# Patient Record
Sex: FEMALE | Race: WHITE | NOT HISPANIC OR LATINO | Employment: UNEMPLOYED | URBAN - METROPOLITAN AREA
[De-identification: names, ages, dates, MRNs, and addresses within clinical notes are randomized per-mention and may not be internally consistent; named-entity substitution may affect disease eponyms.]

---

## 2018-07-25 ENCOUNTER — HISTORICAL (OUTPATIENT)
Dept: ADMINISTRATIVE | Facility: HOSPITAL | Age: 53
End: 2018-07-25

## 2018-07-25 LAB
ABS NEUT (OLG): 1.9
ALBUMIN SERPL-MCNC: 4.2 GM/DL (ref 3.4–5)
ALBUMIN/GLOB SERPL: 1.83 {RATIO} (ref 1.5–2.5)
ALP SERPL-CCNC: 57 UNIT/L (ref 38–126)
ALT SERPL-CCNC: 15 UNIT/L (ref 7–52)
APPEARANCE, UA: CLEAR
AST SERPL-CCNC: 18 UNIT/L (ref 15–37)
BACTERIA #/AREA URNS AUTO: ABNORMAL /HPF
BILIRUB SERPL-MCNC: 0.7 MG/DL (ref 0.2–1)
BILIRUB UR QL STRIP: NEGATIVE MG/DL
BILIRUBIN DIRECT+TOT PNL SERPL-MCNC: 0.2 MG/DL (ref 0–0.5)
BILIRUBIN DIRECT+TOT PNL SERPL-MCNC: 0.5 MG/DL
BUN SERPL-MCNC: 22 MG/DL (ref 7–18)
CALCIUM SERPL-MCNC: 8.6 MG/DL (ref 8.5–10)
CHLORIDE SERPL-SCNC: 100 MMOL/L (ref 98–107)
CHOLEST SERPL-MCNC: 186 MG/DL (ref 0–200)
CHOLEST/HDLC SERPL: 2.7 {RATIO}
CO2 SERPL-SCNC: 32 MMOL/L (ref 21–32)
COLOR UR: YELLOW
CREAT SERPL-MCNC: 0.72 MG/DL (ref 0.6–1.3)
ERYTHROCYTE [DISTWIDTH] IN BLOOD BY AUTOMATED COUNT: 12.5 % (ref 11.5–17)
GLOBULIN SER-MCNC: 2.3 GM/DL (ref 1.2–3)
GLUCOSE (UA): NEGATIVE MG/DL
GLUCOSE SERPL-MCNC: 100 MG/DL (ref 74–106)
HCT VFR BLD AUTO: 41.4 % (ref 37–47)
HDLC SERPL-MCNC: 69 MG/DL (ref 35–60)
HGB BLD-MCNC: 14 GM/DL (ref 12–16)
HGB UR QL STRIP: ABNORMAL UNIT/L
KETONES UR QL STRIP: NEGATIVE MG/DL
LDLC SERPL CALC-MCNC: 98 MG/DL (ref 0–129)
LEUKOCYTE ESTERASE UR QL STRIP: NEGATIVE UNIT/L
LYMPHOCYTES # BLD AUTO: 1.1 X10(3)/MCL (ref 0.6–3.4)
LYMPHOCYTES NFR BLD AUTO: 31.8 % (ref 13–40)
MCH RBC QN AUTO: 32.2 PG (ref 27–31.2)
MCHC RBC AUTO-ENTMCNC: 34 GM/DL (ref 32–36)
MCV RBC AUTO: 95 FL (ref 80–94)
MONOCYTES # BLD AUTO: 0.5 X10(3)/MCL (ref 0–1.8)
MONOCYTES NFR BLD AUTO: 14.1 % (ref 0.1–24)
NEUTROPHILS NFR BLD AUTO: 54.1 % (ref 47–80)
NITRITE UR QL STRIP.AUTO: NEGATIVE
PH UR STRIP: 7 [PH]
PLATELET # BLD AUTO: 168 X10(3)/MCL (ref 130–400)
PMV BLD AUTO: 10.5 FL
POTASSIUM SERPL-SCNC: 3.6 MMOL/L (ref 3.5–5.1)
PROT SERPL-MCNC: 6.5 GM/DL (ref 6.4–8.2)
PROT UR QL STRIP: NEGATIVE MG/DL
RBC # BLD AUTO: 4.35 X10(6)/MCL (ref 4.2–5.4)
RBC #/AREA URNS HPF: ABNORMAL /HPF
SODIUM SERPL-SCNC: 140 MMOL/L (ref 136–145)
SP GR UR STRIP: 1.01
SQUAMOUS EPITHELIAL, UA: ABNORMAL /LPF
TRIGL SERPL-MCNC: 58 MG/DL (ref 30–150)
TSH SERPL-ACNC: 1 MIU/ML (ref 0.35–4.94)
UROBILINOGEN UR STRIP-ACNC: 0.2 MG/DL
VLDLC SERPL CALC-MCNC: 11.6 MG/DL
WBC # SPEC AUTO: 3.5 X10(3)/MCL (ref 4.5–11.5)
WBC #/AREA URNS AUTO: ABNORMAL /[HPF]

## 2018-09-06 ENCOUNTER — HISTORICAL (OUTPATIENT)
Dept: ADMINISTRATIVE | Facility: HOSPITAL | Age: 53
End: 2018-09-06

## 2018-09-06 LAB
ABS NEUT (OLG): 2.4
ERYTHROCYTE [DISTWIDTH] IN BLOOD BY AUTOMATED COUNT: 12.7 % (ref 11.5–17)
HCT VFR BLD AUTO: 42.1 % (ref 37–47)
HGB BLD-MCNC: 14 GM/DL (ref 12–16)
LYMPHOCYTES # BLD AUTO: 1.5 X10(3)/MCL (ref 0.6–3.4)
LYMPHOCYTES NFR BLD AUTO: 34.5 % (ref 13–40)
MCH RBC QN AUTO: 31.9 PG (ref 27–31.2)
MCHC RBC AUTO-ENTMCNC: 33 GM/DL (ref 32–36)
MCV RBC AUTO: 96 FL (ref 80–94)
MONOCYTES # BLD AUTO: 0.5 X10(3)/MCL (ref 0–1.8)
MONOCYTES NFR BLD AUTO: 10.7 % (ref 0.1–24)
NEUTROPHILS NFR BLD AUTO: 54.8 % (ref 47–80)
PLATELET # BLD AUTO: 169 X10(3)/MCL (ref 130–400)
PMV BLD AUTO: 10.1 FL
RBC # BLD AUTO: 4.39 X10(6)/MCL (ref 4.2–5.4)
WBC # SPEC AUTO: 4.4 X10(3)/MCL (ref 4.5–11.5)

## 2019-01-08 ENCOUNTER — OFFICE VISIT (OUTPATIENT)
Dept: INFECTIOUS DISEASES | Facility: CLINIC | Age: 54
End: 2019-01-08

## 2019-01-08 ENCOUNTER — CLINICAL SUPPORT (OUTPATIENT)
Dept: INFECTIOUS DISEASES | Facility: CLINIC | Age: 54
End: 2019-01-08

## 2019-01-08 VITALS
TEMPERATURE: 98 F | WEIGHT: 120.56 LBS | SYSTOLIC BLOOD PRESSURE: 131 MMHG | HEART RATE: 55 BPM | DIASTOLIC BLOOD PRESSURE: 74 MMHG

## 2019-01-08 DIAGNOSIS — Z71.84 COUNSELING ABOUT TRAVEL: Primary | ICD-10-CM

## 2019-01-08 DIAGNOSIS — Z71.85 VACCINE COUNSELING: ICD-10-CM

## 2019-01-08 DIAGNOSIS — I10 HYPERTENSION, UNSPECIFIED TYPE: ICD-10-CM

## 2019-01-08 PROCEDURE — 99999 PR PBB SHADOW E&M-NEW PATIENT-LVL III: ICD-10-PCS | Mod: PBBFAC,,, | Performed by: INTERNAL MEDICINE

## 2019-01-08 PROCEDURE — 90717 YELLOW FEVER VACCINE SQ: ICD-10-PCS | Mod: S$GLB,,, | Performed by: INTERNAL MEDICINE

## 2019-01-08 PROCEDURE — 99402 PR PREVENT COUNSEL,INDIV,30 MIN: ICD-10-PCS | Mod: S$GLB,,, | Performed by: INTERNAL MEDICINE

## 2019-01-08 PROCEDURE — 99999 PR PBB SHADOW E&M-NEW PATIENT-LVL III: CPT | Mod: PBBFAC,,, | Performed by: INTERNAL MEDICINE

## 2019-01-08 PROCEDURE — 90717 YELLOW FEVER VACCINE SUBQ: CPT | Mod: S$GLB,,, | Performed by: INTERNAL MEDICINE

## 2019-01-08 PROCEDURE — 90471 YELLOW FEVER VACCINE SQ: ICD-10-PCS | Mod: S$GLB,,, | Performed by: INTERNAL MEDICINE

## 2019-01-08 PROCEDURE — 99402 PREV MED CNSL INDIV APPRX 30: CPT | Mod: S$GLB,,, | Performed by: INTERNAL MEDICINE

## 2019-01-08 PROCEDURE — 90471 IMMUNIZATION ADMIN: CPT | Mod: S$GLB,,, | Performed by: INTERNAL MEDICINE

## 2019-01-08 RX ORDER — ATENOLOL AND CHLORTHALIDONE TABLET 50; 25 MG/1; MG/1
1 TABLET ORAL DAILY
Refills: 3 | COMMUNITY
Start: 2018-12-28 | End: 2022-11-09

## 2019-01-08 RX ORDER — NAPROXEN SODIUM 220 MG/1
TABLET, FILM COATED ORAL
COMMUNITY
End: 2022-10-27

## 2019-01-08 RX ORDER — DOXYCYCLINE 100 MG/1
CAPSULE ORAL
Refills: 2 | COMMUNITY
Start: 2019-01-03 | End: 2022-10-27

## 2019-01-08 RX ORDER — AZITHROMYCIN 500 MG/1
TABLET, FILM COATED ORAL
Qty: 6 TABLET | Refills: 0 | Status: SHIPPED | OUTPATIENT
Start: 2019-01-08 | End: 2022-10-27

## 2019-01-08 RX ORDER — FLURBIPROFEN 100 MG/1
TABLET, FILM COATED ORAL
Refills: 11 | COMMUNITY
Start: 2018-12-17 | End: 2022-10-27

## 2019-01-08 RX ORDER — AZELAIC ACID 0.15 G/G
AEROSOL, FOAM TOPICAL
COMMUNITY
Start: 2018-11-09 | End: 2022-10-27

## 2019-01-08 NOTE — PROGRESS NOTES
Ms. Sanabria has received the Yellow Fever vaccine  Yellow card stamped and signed   Patient tolerated well.  Patient left unit in [facility].

## 2019-01-08 NOTE — PROGRESS NOTES
INFECTIOUS DISEASE TRAVEL CLINIC  01/08/2019 2:09 PM    Subjective:      Chief Complaint:   Chief Complaint   Patient presents with    Travel Consult     [place]   and [place]    History of Present Illness    Patient Samantha Sanabria is a 53 y.o. female who presents today for routine pretravel consultation.  The patient reports a past medical history of HTN.   The patient will likely be traveling to [place] and [facility] to visit  that will be working in [place] and son that will be studying abroad in [facility] in August 2019. Exact travel arrangements have not been worked out yet. Doesn't plan to travel outside of these two cities.     The patient has travelled to the following other countries in the past; none.  The patient reports that they are up to date on all their childhood vaccinations.  The patient reports receipt of the following travel related vaccinations;     Typhoid 12/18/18  twinrex 12/18/18; in process  Influenza 2018  Td 2018  2 doses MMR    Review of Symptoms:  Constitutional: Denies fevers, chills, or weakness.  Cardiovascular: Denies chest pain, palpitations  Respiratory: Denies shortness of breath, cough, hemoptysis  GI: Denies nausea/vomitting,, abd pain  : Denies dysuria  Musculoskeletal: Denies joint pain or myalgias.  Skin/breast: Denies rashes, lumps, lesions, or discharge.  Neurologic: Denies headache     PMhx: HTN    History reviewed. No pertinent surgical history.    History reviewed. No pertinent family history.     Medications: atnolol-chlorthalidone, asa    Social History     Socioeconomic History    Marital status:      Spouse name: Not on file    Number of children: Not on file    Years of education: Not on file    Highest education level: Not on file   Social Needs    Financial resource strain: Not on file    Food insecurity - worry: Not on file    Food insecurity - inability: Not on file    Transportation needs - medical: Not on file     Transportation needs - non-medical: Not on file   Occupational History    Not on file   Tobacco Use    Smoking status: Never Smoker    Smokeless tobacco: Never Used   Substance and Sexual Activity    Alcohol use: Yes    Drug use: Not on file    Sexual activity: Not on file   Other Topics Concern    Not on file   Social History Narrative    Not on file       Review of patient's allergies indicates:   Allergen Reactions    Iodine and iodide containing products          Objective:   VS (24h):   Vitals:    01/08/19 1316   BP: 131/74   Pulse: (!) 55   Temp: 98.1 °F (36.7 °C)     General: Afebrile, alert, comfortable, no acute distress.   HEENT: CARLOS EDUARDO. EOMI, no scleral icterus.   Pulmonary: Non labored  Extremities: Moves all extremities x 4.   Skin: No jaundice, rashes, or visible lesions.   Neurological:  Alert and oriented x 4.     No results found for: GLU  No results found for: CALCIUM  No results found for: ALBUMIN  No results found for: PROT  No results found for: NA  No results found for: K  No results found for: CO2  No results found for: CL  No results found for: BUN  No results found for: CREATININE  No results found for: ALKPHOS  No results found for: ALT  No results found for: AST  No results found for: BILITOT  No results found for: WBC  No results found for: HGB  No results found for: HCT  No results found for: MCV  No results found for: PLT  No results found for: CHOL  No results found for: HDL  No results found for: LDLCALC  No results found for: TRIG  No results found for: CHOLHDL    HIV: No components found for: HIV 1/2 AG/AB  Hepatitis C IgG: No components found for: HEPATITIS C  Syphilis: No results found for: RPR    Hepatitis A IgG: No components found for: HEPATITIS A IGG  Hepatitis Bc IgG: No components found for: HEPATITIS B CORE IGG  Hepatitis Bs IgG:  Quantiferon: No results found for: QUANTIFERON        Assessment:     Pre-Travel clinic assessment  Vaccine counseling    Plan:      Patient specific risks:      The patient was provided with an extensive travel guidance packet which provides travel information specific to the patients itinerary.     The patient's medical history was reviewed and the patient was counseled on:    Precautions against development of DVT during flight.    Registering with the state department and travel insurance was recommended in case of emergency.     Destination specific risks:      -Infectious Disease risks:      Mosquito Borne pathogens:  Reviewed basic mosquito avoidance precautions including wearing long sleeve clothing and insect repellant.  to prevent insect-borne diseases (e.g., malaria, dengue).The patient was also instructed to purchase insect repellent containing DEET (25-35%; higher strengths last longer, but >35% will damage synthetic materials) and apply premethrin spray on clothing according to repellent label instructions. An anti-malarial agent was not prescribed for malaria prophylaxis (reviewed Travax and no malaria tranmission known to occur in [place])    Food Borne pathogens:  Reviewed basic hand, food and water sanitation precautions.  Patient instructed to take hand  on their trip. The patient was instructed to purchase Imodium over the counter to take in case diarrhea (without blood or fever) develops. Azithromycin was ordered for treatment if severe or bloody diarrhea develops and the patient was instructed on use and possible side effects.    Rabies: Discussed the risk of rabies and precautions to prevent exposure. Patient is aware to seek prompt medical care should they be exposed.     -Environmental risks:     Personal and travel safety. Precautions to minimize risk/exposure to crime and motor vehicle accidents were reviewed with the patient.    Precautions against sun exposure.    -Vaccinations:  The patient's immunization history was reviewed and, based on the patient's itinerary, the following immunizations were  ordered:  - Yellow fever vaccine    The patient was encouraged to contact us about any problems that may develop after immunization and possible side effects were reviewed.    Because of the nationwide shortage of Yellow Fever vaccine, the patient was offered Stamaril vaccine through the research protocol. Inclusion and exclusion criteria were reviewed with the patient and the form completed. Risks and benefits were discussed.   The consent was reviewed by me in detail with the patient and all questions were answered. The patient agrees to participate, and signed the consent. The patient was advised to return and or report any significant side effects related to the vaccine.    The patient was instructed to contact us if problems develop after travel.    >30 min spent with patient and >50% time spent counseling about travel    Yolie Posadas MD, MPH  Infectious Disease

## 2019-01-09 PROBLEM — I10 HYPERTENSION: Status: ACTIVE | Noted: 2019-01-09

## 2019-07-29 ENCOUNTER — HISTORICAL (OUTPATIENT)
Dept: ADMINISTRATIVE | Facility: HOSPITAL | Age: 54
End: 2019-07-29

## 2019-07-29 LAB
ABS NEUT (OLG): 2.7 X10(3)/MCL (ref 2.1–9.2)
ALBUMIN SERPL-MCNC: 4.4 GM/DL (ref 3.4–5)
ALBUMIN/GLOB SERPL: 1.52 {RATIO} (ref 1.5–2.5)
ALP SERPL-CCNC: 48 UNIT/L (ref 38–126)
ALT SERPL-CCNC: 14 UNIT/L (ref 7–52)
APPEARANCE, UA: NORMAL
AST SERPL-CCNC: 17 UNIT/L (ref 15–37)
BACTERIA #/AREA URNS AUTO: NORMAL /HPF
BILIRUB SERPL-MCNC: 0.8 MG/DL (ref 0.2–1)
BILIRUB UR QL STRIP: NEGATIVE MG/DL
BILIRUBIN DIRECT+TOT PNL SERPL-MCNC: 0.2 MG/DL (ref 0–0.5)
BILIRUBIN DIRECT+TOT PNL SERPL-MCNC: 0.6 MG/DL
BUN SERPL-MCNC: 21 MG/DL (ref 7–18)
CALCIUM SERPL-MCNC: 8.8 MG/DL (ref 8.5–10)
CHLORIDE SERPL-SCNC: 100 MMOL/L (ref 98–107)
CHOLEST SERPL-MCNC: 221 MG/DL (ref 0–200)
CHOLEST/HDLC SERPL: 3 {RATIO}
CO2 SERPL-SCNC: 31 MMOL/L (ref 21–32)
COLOR UR: YELLOW
CREAT SERPL-MCNC: 0.66 MG/DL (ref 0.6–1.3)
ERYTHROCYTE [DISTWIDTH] IN BLOOD BY AUTOMATED COUNT: 12.2 % (ref 11.5–17)
GLOBULIN SER-MCNC: 2.9 GM/DL (ref 1.2–3)
GLUCOSE (UA): NEGATIVE MG/DL
GLUCOSE SERPL-MCNC: 111 MG/DL (ref 74–106)
HCT VFR BLD AUTO: 41.2 % (ref 37–47)
HDLC SERPL-MCNC: 74 MG/DL (ref 35–60)
HGB BLD-MCNC: 14.1 GM/DL (ref 12–16)
HGB UR QL STRIP: NEGATIVE UNIT/L
KETONES UR QL STRIP: NEGATIVE MG/DL
LDLC SERPL CALC-MCNC: 117 MG/DL (ref 0–129)
LEUKOCYTE ESTERASE UR QL STRIP: NEGATIVE UNIT/L
LYMPHOCYTES # BLD AUTO: 1.5 X10(3)/MCL (ref 0.6–3.4)
LYMPHOCYTES NFR BLD AUTO: 31 % (ref 13–40)
MCH RBC QN AUTO: 32 PG (ref 27–31.2)
MCHC RBC AUTO-ENTMCNC: 34 GM/DL (ref 32–36)
MCV RBC AUTO: 93 FL (ref 80–94)
MONOCYTES # BLD AUTO: 0.6 X10(3)/MCL (ref 0.1–1.3)
MONOCYTES NFR BLD AUTO: 12.4 % (ref 0.1–24)
NEUTROPHILS NFR BLD AUTO: 56.6 % (ref 47–80)
NITRITE UR QL STRIP.AUTO: NEGATIVE
PH UR STRIP: 7 [PH]
PLATELET # BLD AUTO: 188 X10(3)/MCL (ref 130–400)
PMV BLD AUTO: 10.4 FL (ref 9.4–12.4)
POTASSIUM SERPL-SCNC: 3.6 MMOL/L (ref 3.5–5.1)
PROT SERPL-MCNC: 7.3 GM/DL (ref 6.4–8.2)
PROT UR QL STRIP: NEGATIVE MG/DL
RBC # BLD AUTO: 4.41 X10(6)/MCL (ref 4.2–5.4)
RBC #/AREA URNS HPF: NORMAL /HPF
SODIUM SERPL-SCNC: 138 MMOL/L (ref 136–145)
SP GR UR STRIP: 1.02
SQUAMOUS EPITHELIAL, UA: NORMAL /LPF
TRIGL SERPL-MCNC: 57 MG/DL (ref 30–150)
TSH SERPL-ACNC: 0.96 MIU/ML (ref 0.35–4.94)
UROBILINOGEN UR STRIP-ACNC: 0.2 MG/DL
VLDLC SERPL CALC-MCNC: 11.4 MG/DL
WBC # SPEC AUTO: 4.8 X10(3)/MCL (ref 4.5–11.5)
WBC #/AREA URNS AUTO: NORMAL /[HPF]

## 2019-07-30 LAB
EST. AVERAGE GLUCOSE BLD GHB EST-MCNC: 94 MG/DL
HBA1C MFR BLD: 4.9 % (ref 4.4–6.4)

## 2020-10-13 ENCOUNTER — HISTORICAL (OUTPATIENT)
Dept: ADMINISTRATIVE | Facility: HOSPITAL | Age: 55
End: 2020-10-13

## 2020-10-13 LAB
ABS NEUT (OLG): 2.9 X10(3)/MCL (ref 2.1–9.2)
ALBUMIN SERPL-MCNC: 4.4 GM/DL (ref 3.4–5)
ALBUMIN/GLOB SERPL: 2.1 {RATIO} (ref 1.5–2.5)
ALP SERPL-CCNC: 56 UNIT/L (ref 38–126)
ALT SERPL-CCNC: 15 UNIT/L (ref 7–52)
APPEARANCE, UA: ABNORMAL
AST SERPL-CCNC: 20 UNIT/L (ref 15–37)
BACTERIA #/AREA URNS AUTO: ABNORMAL /HPF
BILIRUB SERPL-MCNC: 0.5 MG/DL (ref 0.2–1)
BILIRUB UR QL STRIP: NEGATIVE MG/DL
BILIRUBIN DIRECT+TOT PNL SERPL-MCNC: 0.1 MG/DL (ref 0–0.5)
BILIRUBIN DIRECT+TOT PNL SERPL-MCNC: 0.4 MG/DL
BUN SERPL-MCNC: 24 MG/DL (ref 7–18)
CALCIUM SERPL-MCNC: 9.4 MG/DL (ref 8.5–10)
CHLORIDE SERPL-SCNC: 102 MMOL/L (ref 98–107)
CHOLEST SERPL-MCNC: 216 MG/DL (ref 0–200)
CHOLEST/HDLC SERPL: 2.9 {RATIO}
CO2 SERPL-SCNC: 32 MMOL/L (ref 21–32)
COLOR UR: YELLOW
CREAT SERPL-MCNC: 0.7 MG/DL (ref 0.6–1.3)
ERYTHROCYTE [DISTWIDTH] IN BLOOD BY AUTOMATED COUNT: 12.7 % (ref 11.5–17)
EST. AVERAGE GLUCOSE BLD GHB EST-MCNC: 100 MG/DL
GLOBULIN SER-MCNC: 2.2 GM/DL (ref 1.2–3)
GLUCOSE (UA): NEGATIVE MG/DL
GLUCOSE SERPL-MCNC: 120 MG/DL (ref 74–106)
HBA1C MFR BLD: 5.1 % (ref 4.4–6.4)
HCT VFR BLD AUTO: 42.4 % (ref 37–47)
HDLC SERPL-MCNC: 75 MG/DL (ref 35–60)
HGB BLD-MCNC: 14 GM/DL (ref 12–16)
HGB UR QL STRIP: NEGATIVE UNIT/L
KETONES UR QL STRIP: NEGATIVE MG/DL
LDLC SERPL CALC-MCNC: 131 MG/DL (ref 0–129)
LEUKOCYTE ESTERASE UR QL STRIP: ABNORMAL UNIT/L
LYMPHOCYTES # BLD AUTO: 1.5 X10(3)/MCL (ref 0.6–3.4)
LYMPHOCYTES NFR BLD AUTO: 29.7 % (ref 13–40)
MCH RBC QN AUTO: 31 PG (ref 27–31.2)
MCHC RBC AUTO-ENTMCNC: 33 GM/DL (ref 32–36)
MCV RBC AUTO: 94 FL (ref 80–94)
MONOCYTES # BLD AUTO: 0.6 X10(3)/MCL (ref 0.1–1.3)
MONOCYTES NFR BLD AUTO: 12 % (ref 0.1–24)
NEUTROPHILS NFR BLD AUTO: 58.3 % (ref 47–80)
NITRITE UR QL STRIP.AUTO: NEGATIVE
PH UR STRIP: 8.5 [PH]
PLATELET # BLD AUTO: 201 X10(3)/MCL (ref 130–400)
PMV BLD AUTO: 10.4 FL (ref 9.4–12.4)
POTASSIUM SERPL-SCNC: 4.3 MMOL/L (ref 3.5–5.1)
PROT SERPL-MCNC: 6.5 GM/DL (ref 6.4–8.2)
PROT UR QL STRIP: ABNORMAL MG/DL
RBC # BLD AUTO: 4.51 X10(6)/MCL (ref 4.2–5.4)
RBC #/AREA URNS HPF: ABNORMAL /HPF
SODIUM SERPL-SCNC: 141 MMOL/L (ref 136–145)
SP GR UR STRIP: 1.02
SQUAMOUS EPITHELIAL, UA: ABNORMAL /LPF
TRIGL SERPL-MCNC: 47 MG/DL (ref 30–150)
TSH SERPL-ACNC: 0.89 MIU/ML (ref 0.35–4.94)
UROBILINOGEN UR STRIP-ACNC: 0.2 MG/DL
VLDLC SERPL CALC-MCNC: 9.4 MG/DL
WBC # SPEC AUTO: 5 X10(3)/MCL (ref 4.5–11.5)
WBC #/AREA URNS AUTO: ABNORMAL /[HPF]

## 2021-10-21 ENCOUNTER — HISTORICAL (OUTPATIENT)
Dept: ADMINISTRATIVE | Facility: HOSPITAL | Age: 56
End: 2021-10-21

## 2021-10-21 LAB
ABS NEUT (OLG): 2.1 X10(3)/MCL (ref 2.1–9.2)
ALBUMIN SERPL-MCNC: 4.6 GM/DL (ref 3.4–5)
ALBUMIN/GLOB SERPL: 1.7 {RATIO} (ref 1.5–2.5)
ALP SERPL-CCNC: 61 UNIT/L (ref 38–126)
ALT SERPL-CCNC: 14 UNIT/L (ref 7–52)
APPEARANCE, UA: ABNORMAL
AST SERPL-CCNC: 17 UNIT/L (ref 15–37)
BACTERIA #/AREA URNS AUTO: ABNORMAL /HPF
BILIRUB SERPL-MCNC: 0.6 MG/DL (ref 0.2–1)
BILIRUB UR QL STRIP: NEGATIVE MG/DL
BILIRUBIN DIRECT+TOT PNL SERPL-MCNC: 0.1 MG/DL (ref 0–0.5)
BILIRUBIN DIRECT+TOT PNL SERPL-MCNC: 0.5 MG/DL
BUN SERPL-MCNC: 16 MG/DL (ref 7–18)
CALCIUM SERPL-MCNC: 9.9 MG/DL (ref 8.5–10)
CHLORIDE SERPL-SCNC: 99 MMOL/L (ref 98–107)
CHOLEST SERPL-MCNC: 231 MG/DL (ref 0–200)
CHOLEST/HDLC SERPL: 3.3 {RATIO}
CO2 SERPL-SCNC: 33 MMOL/L (ref 21–32)
COLOR UR: YELLOW
CREAT SERPL-MCNC: 0.77 MG/DL (ref 0.6–1.3)
ERYTHROCYTE [DISTWIDTH] IN BLOOD BY AUTOMATED COUNT: 11.7 % (ref 11.5–17)
EST. AVERAGE GLUCOSE BLD GHB EST-MCNC: 103 MG/DL
GLOBULIN SER-MCNC: 2.7 GM/DL (ref 1.2–3)
GLUCOSE (UA): NEGATIVE MG/DL
GLUCOSE SERPL-MCNC: 117 MG/DL (ref 74–106)
HBA1C MFR BLD: 5.2 % (ref 4.4–6.4)
HCT VFR BLD AUTO: 43.6 % (ref 37–47)
HDLC SERPL-MCNC: 69 MG/DL (ref 35–60)
HGB BLD-MCNC: 14.4 GM/DL (ref 12–16)
HGB UR QL STRIP: NEGATIVE UNIT/L
KETONES UR QL STRIP: NEGATIVE MG/DL
LDLC SERPL CALC-MCNC: 121 MG/DL (ref 0–129)
LEUKOCYTE ESTERASE UR QL STRIP: ABNORMAL UNIT/L
LYMPHOCYTES # BLD AUTO: 1.5 X10(3)/MCL (ref 0.6–3.4)
LYMPHOCYTES NFR BLD AUTO: 38.6 % (ref 13–40)
MCH RBC QN AUTO: 30.7 PG (ref 27–31.2)
MCHC RBC AUTO-ENTMCNC: 33 GM/DL (ref 32–36)
MCV RBC AUTO: 93 FL (ref 80–94)
MONOCYTES # BLD AUTO: 0.4 X10(3)/MCL (ref 0.1–1.3)
MONOCYTES NFR BLD AUTO: 10.3 % (ref 0.1–24)
NEUTROPHILS NFR BLD AUTO: 51.1 % (ref 47–80)
NITRITE UR QL STRIP.AUTO: NEGATIVE
PH UR STRIP: 8 [PH]
PLATELET # BLD AUTO: 197 X10(3)/MCL (ref 130–400)
PMV BLD AUTO: 11 FL (ref 9.4–12.4)
POTASSIUM SERPL-SCNC: 3.9 MMOL/L (ref 3.5–5.1)
PROT SERPL-MCNC: 7.3 GM/DL (ref 6.4–8.2)
PROT UR QL STRIP: NEGATIVE MG/DL
RBC # BLD AUTO: 4.69 X10(6)/MCL (ref 4.2–5.4)
RBC #/AREA URNS HPF: ABNORMAL /HPF
SODIUM SERPL-SCNC: 140 MMOL/L (ref 136–145)
SP GR UR STRIP: 1.02
SQUAMOUS EPITHELIAL, UA: ABNORMAL /LPF
TRIGL SERPL-MCNC: 91 MG/DL (ref 30–150)
TSH SERPL-ACNC: 1.21 MIU/ML (ref 0.35–4.94)
UROBILINOGEN UR STRIP-ACNC: 0.2 MG/DL
VLDLC SERPL CALC-MCNC: 18.2 MG/DL
WBC # SPEC AUTO: 4 X10(3)/MCL (ref 4.5–11.5)
WBC #/AREA URNS AUTO: ABNORMAL /[HPF]

## 2022-04-10 ENCOUNTER — HISTORICAL (OUTPATIENT)
Dept: ADMINISTRATIVE | Facility: HOSPITAL | Age: 57
End: 2022-04-10
Payer: COMMERCIAL

## 2022-04-25 VITALS
WEIGHT: 123.44 LBS | BODY MASS INDEX: 23.3 KG/M2 | SYSTOLIC BLOOD PRESSURE: 120 MMHG | DIASTOLIC BLOOD PRESSURE: 70 MMHG | HEIGHT: 61 IN

## 2022-05-03 NOTE — HISTORICAL [facility] CERNER
This is a historical note converted from Akbar. Formatting and pictures may have been removed.  Please reference Akbar for original formatting and attached multimedia. Chief Complaint  WELLNESS/FASTING  History of Present Illness  Patient is here today for wellness CPX.? She did have her colonoscopy performed last year which found two?serrated sessile polyps, and she is due to repeat this in 5 years.? She continues to exercise running several miles, ?several times per week.? Her blood pressure has been well controlled?and she is tolerating all medications without side effects.? She does complain today of?neck pain for the past several months. ?This is giving her difficulty sleeping at night. ?She is not having any radicular symptoms. ?She did go to a walk-in clinic?several months ago?and an x-ray was performed which?showed some form of calcification.? She is not to the point that she would want to do any?surgical intervention or?injections.? She wants to hold off?on? physical therapy as well.  Review of Systems  GENERAL:??no?unexplained wtloss, fever, fatigue, chills, night sweats or weakness  HEENT: no? sore throat, ear pain, sinus pressure, nasal congestion, or rhinorrhea  VISION:?no vision changes, glaucoma, cataracts? + reading glasses  CARDIAC: no?chest pain,?palpitations,?Dyspnea on exertion,?orthopnea  RESPIRATORY:?no?cough,?wheezing, sputum production,or?SOB  GI: no??abdominal pain,?n&v, constipation,?diarrhea,??blood in stool or_? no? family history of colon cancer_  :?no? dysuria, hematuria, frequency, ?urgency, incontinence,? vaginal discharge,? abn. vaginal bleeding? +irregular menses  MUSC/SKEL:? no? myalgia, weakness, edema,? +arthralgia( neck) no radicular sx, or?no joint effusion  SKIN:? No?rash, hives, ?itching or ?sores (see derm)  NEURO:? No headaches, numbness, ?tingling, weakness, or?dizziness  PSYCH:? No anxiety, ?depression, ?irritability, ?suicidal ideation or?hallucinations  ENDO:? No  polyuria, polydipsia, ?polyphagia  HEME:? No?Bruising, ?lymphadenopathy, bleeding disorders ?or?signs of anemia  Physical Exam  Vitals & Measurements  HR:?60(Peripheral)? BP:?122/70?  HT:?154?cm? HT:?154?cm? WT:?51.1?kg? WT:?51.1?kg? BMI:?21.55?  GENERAL: NAD, alert and oriented x 3  SKIN:? no rash or abnormal appearing skin lesions  HEENT:? PERRLA, EOMI, mouth wnl, throat wnl, EAC and TM wnl bilaterally, nose clear  NECK:? FROM, no lymphadenopathy, no thyroid abnormalities palpable, no bruits  CHEST:? CTA bilaterally no wheezes, crackles or rubs  CARDIAC:? RRR, no murmurs audible  ABDOMEN:? Soft, nontender, nondistended, NBSx4,?no rebound or guarding, no HSM  EXTREMITIES:? no clubbing, cyanosis, or edema.? joints wnl. +2 DP/PT pulse bilaterally  NEURO:? no sensory or motor deficits noted. CN II-XII intact. Gait wnl.?  GENITAL: defer to gyn(Dr Bermudez ) ov next month  Assessment/Plan  1.?Wellness examination  CBC, CMP, FLP, U/A,?TSH??,Colonoscopy UTD, due 2022, pt will check insurance for shingerix. asa 81 mg q d?encouraged patient to continue?her exercise regimen?and attempt to get at least 150 minutes of moderate aerobic exercise per week or 75 minutes of vigorous exercise per week.? Her GYN visits and mammograms are up-to-date.  Ordered:  CBC w/ Auto Diff, Routine collect, 07/25/18 9:46:00 CDT, Blood, Order for future visit, Stop date 07/25/18 9:46:00 CDT, Lab Collect, Wellness examination, 07/25/18 9:46:00 CDT  Clinic Follow up, *Est. 07/25/19 3:00:00 CDT, Order for future visit, Wellness examination  Hypertension  H/O partial thyroidectomy, [facility] AFP  Comprehensive Metabolic Panel, Routine collect, 07/25/18 9:46:00 CDT, Blood, Order for future visit, Stop date 07/25/18 9:46:00 CDT, Lab Collect, Wellness examination, 07/25/18 9:46:00 CDT  Lab Collection Request, 07/25/18 9:46:00 CDT, [facility] - AFP, 07/25/18 9:46:00 CDT  Lipid Panel, Routine collect, 07/25/18 9:46:00 CDT, Blood, Order for future visit, Stop  date 07/25/18 9:46:00 CDT, Lab Collect, Wellness examination, 07/25/18 9:46:00 CDT  Preventative Health Care Est 40-64 years [id number] PC, Wellness examination  H/O partial thyroidectomy  Hypertension  Neck pain, [facility] AMB - AFP, 07/25/18 9:46:00 CDT  Urinalysis Complete no reflex, Routine collect, Urine, Order for future visit, 07/25/18 9:46:00 CDT, Stop date 07/25/18 9:46:00 CDT, Nurse collect, Wellness examination  ?  2.?H/O partial thyroidectomy  ?check TSH  Ordered:  Clinic Follow up, *Est. 07/25/19 3:00:00 CDT, Order for future visit, Wellness examination  Hypertension  H/O partial thyroidectomy, HLink AFP  Preventative Health Care Est 40-64 years [id number] PC, Wellness examination  H/O partial thyroidectomy  Hypertension  Neck pain, [facility] AMB - AFP, 07/25/18 9:46:00 CDT  Thyroid Stimulating Hormone, Routine collect, 07/25/18 9:46:00 CDT, Blood, Order for future visit, Stop date 07/25/18 9:46:00 CDT, Lab Collect, H/O partial thyroidectomy, 07/25/18 9:46:00 CDT  ?  3.?Hypertension  Continue?atenolol/chlorthalidone.  Ordered:  Clinic Follow up, *Est. 07/25/19 3:00:00 CDT, Order for future visit, Wellness examination  Hypertension  H/O partial thyroidectomy, HLink AFP  Preventative Health Care Est 40-64 years [id number] PC, Wellness examination  H/O partial thyroidectomy  Hypertension  Neck pain, [facility] AMB - AFP, 07/25/18 9:46:00 CDT  ?  4.?Neck pain  ?xray today, trial mobic 7.5 mg  Ordered:  Preventative Health Care Est 40-64 years [id number] PC, Wellness examination  H/O partial thyroidectomy  Hypertension  Neck pain, [facility] AMB - AFP, 07/25/18 9:46:00 CDT  XR Spine Cervical 2 or 3 Views, Routine, 07/25/18 9:46:00 CDT, Other (please specify), None, Ambulatory, Rad Type, Neck pain, [facility], 07/25/18 9:46:00 CDT  ?  Orders:  atenolol-chlorthalidone, 1 tab(s), Oral, Daily, # 90 tab(s), 3 Refill(s), Pharmacy: [id number]   Problem List/Past Medical History  Ongoing  Cellulitis of right index  finger  H/O partial thyroidectomy  Hypertension  Neck pain  Wellness examination  Historical  Hypertension  Thyroid nodule  Procedure/Surgical History  Colonoscopy (10/12/2017)  Mammogram (07/01/2017)  Partial thyroidectomy (2001)   Medications  atenolol-chlorthalidone 50 mg-25 mg oral tablet, 1 tab(s), Oral, Daily, 3 refills  FINACEA 15% FOAM  MINOCYCLINE 100 MG CAPSULE  Mobic 7.5 mg oral tablet, 7.5 mg= 1 tab(s), Oral, Daily  Multivitamins and Minerals oral tablet  Allergies  Seafood?(UNKNOWN)  iodine?(UNKNOWN)  Social History  Alcohol  Current, Wine, 06/04/2018  Employment/School  Employed, 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  High cholesterol: Father.  Hypertension.: Father.  Hypothyroidism.: Sister.  Osteopenia: Mother.  Thyroid cancer: Sister.  Immunizations  Vaccine Date Status   influenza virus vaccine, inactivated 08/30/2017 Recorded   tetanus/diphth/pertuss (Tdap) adult/adol 06/24/2015 Recorded   influenza virus vaccine, inactivated 10/20/2014 Recorded   Health Maintenance  Health Maintenance  ???Pending?(in the next year)  ??? ??OverDue  ??? ? ? ?Influenza Vaccine due??and every?  ??? ??Due?  ??? ? ? ?Alcohol Misuse Screening due??07/25/18??and every 1??year(s)  ??? ? ? ?Depression Screening due??07/25/18??and every?  ??? ? ? ?Diabetes Screening due??07/25/18??and every?  ??? ? ? ?Hypertension Maintenance-Medication Prescribed due??07/25/18??and every 1??year(s)  ??? ? ? ?Hypertension Management-Education due??07/25/18??and every 1??year(s)  ??? ? ? ?Hypertension Management-BMP due??07/25/18??and every?  ??? ? ? ?Lipid Screening due??07/25/18??and every?  ??? ? ? ?Smoking Cessation due??07/25/18??and every 1??year(s)  ??? ??Due In Future?  ??? ? ? ?Breast Cancer Screening not due until??09/07/18??and  every 2??year(s)  ??? ? ? ?Blood Pressure Screening not due until??06/04/19??and every 1??year(s)  ??? ? ? ?Body Mass Index Check not due until??06/04/19??and every 1??year(s)  ??? ? ? ?Hypertension Management-Blood Pressure not due until??06/04/19??and every 1??year(s)  ???Satisfied?(in the past 1 year)  ??? ??Satisfied?  ??? ? ? ?Blood Pressure Screening on??07/25/18.??Satisfied by Linda Sharpe  ??? ? ? ?Body Mass Index Check on??07/25/18.??Satisfied by Linda Sharpe  ??? ? ? ?Cervical Cancer Screening on??10/02/17.??Satisfied by Helene Carbajal.  ??? ? ? ?Colorectal Screening on??10/12/17.??Satisfied by Marina Paz.  ??? ? ? ?Diabetes Screening on??07/25/18.??Satisfied by Hugo Gutierrez MD  ??? ? ? ?Hypertension Management-Blood Pressure on??07/25/18.??Satisfied by Linda Sharpe  ??? ? ? ?Influenza Vaccine on??08/30/17.??Satisfied by Marina Paz.  ??? ? ? ?Lipid Screening on??07/25/18.??Satisfied by Hugo Gutierrez MD  ??? ? ? ?Obesity Screening on??07/25/18.??Satisfied by Linda Sharpe  ?  ?

## 2022-10-26 PROBLEM — Z00.00 WELLNESS EXAMINATION: Status: ACTIVE | Noted: 2022-10-26

## 2022-10-26 PROBLEM — E89.0 H/O PARTIAL THYROIDECTOMY: Status: ACTIVE | Noted: 2022-10-26

## 2023-01-30 PROBLEM — Z00.00 WELLNESS EXAMINATION: Status: RESOLVED | Noted: 2022-10-26 | Resolved: 2023-01-30

## 2023-10-31 PROBLEM — Z23 IMMUNIZATION DUE: Status: ACTIVE | Noted: 2023-10-31

## 2023-10-31 PROBLEM — Z82.49 FAMILY HISTORY OF ISCHEMIC HEART DISEASE: Status: ACTIVE | Noted: 2023-10-31

## 2024-02-05 PROBLEM — Z00.00 ENCOUNTER FOR WELLNESS EXAMINATION IN ADULT: Status: RESOLVED | Noted: 2022-10-26 | Resolved: 2024-02-05
